# Patient Record
(demographics unavailable — no encounter records)

---

## 2024-12-04 NOTE — HISTORY OF PRESENT ILLNESS
[FreeTextEntry1] : Follow up hyperthyroidism due to Grave's disease,  thyroid nodules, osteoporosis.   Found to have severe hyperthyroidism 17 years ago in evaluation of weight loss and tremors.   Was started on MMI and has been taking it since diagnosis.   Did have some orbitopathy, which has improved.   Refuses  I-131 therapy.  Has not followed through with request for surgical consult for thyroidectomy. Thyroid US showed isthmus 2.1 cm isoechoic nodule.  Thyroid uptake and scan was recommended, but patient did not complete.   History of hip fracture and DXA showed T score of - 3.1.   Taking Calcium + D 500 mg daily.  Has 1-2 servings of dairy every day.    Started Alendronate in 10/2022 and taking weekly vitamin D.   Current Regimen: Propranolol ER 60 mg daily MMI  5 mg once daily (reduced last visit) Alendronate 70 mg qweek  Denies any palpitations, tremors or heat intolerance.

## 2024-12-04 NOTE — PHYSICAL EXAM
[Healthy Appearance] : healthy appearance [No Acute Distress] : no acute distress [Normal Sclera/Conjunctiva] : normal sclera/conjunctiva [No Proptosis] : no proptosis [No LAD] : no lymphadenopathy [Supple] : the neck was supple [No Respiratory Distress] : no respiratory distress [Clear to Auscultation] : lungs were clear to auscultation bilaterally [Normal S1, S2] : normal S1 and S2 [No Murmurs] : no murmurs [Normal Rate] : heart rate was normal [Regular Rhythm] : with a regular rhythm [Acanthosis Nigricans] : no acanthosis nigricans [No Tremors] : no tremors [Normal Affect] : the affect was normal [Normal Insight/Judgement] : insight and judgment were intact [Normal Mood] : the mood was normal [de-identified] : Thyroid is enlarged twice normal size and nodular

## 2024-12-04 NOTE — REASON FOR VISIT
[Follow - Up] : a follow-up visit [Hyperthyroidism] : hyperthyroidism [Osteoporosis] : osteoporosis [Thyroid nodule/ MNG] : thyroid nodule/ MNG

## 2024-12-04 NOTE — ASSESSMENT
[FreeTextEntry1] : 68 year old female with Hyperthyroidism due to Graves disease with associated multinodular goiter and osteoporosis here for follow up.   Her hyperthyroidism has worsened.    1. Hyperthyroidism-   Recommended definitive therapy with surgery since she refuses I-131.  Increase MMI back to 7.5 mg daily.    2. Thyroid nodules-   Check thyroid US now.    3. OP- Continue vitamin D Rx and alendronate.   Check DXA now.   Follow up in 4 months.

## 2024-12-04 NOTE — DATA REVIEWED
[FreeTextEntry1] : Thyroid US 3/15/2022: Right isthmus 1.1 x 0.9 x 1 cm echogenic nodule (smaller) left isthmus  2.1 x 1.5 x 1.6 cm isoechoic nodule (increased in size) left lower pole 1.1 x 1 x 1.1 cm isoechoic nodule (new)  Thyroid US  4/8/2018: Right lobe 7 x 3.4 x 2.5 cm Left lobe 6 x 2.2 x 2.8 Markedly heterogeneous and vascular gland Right isthmus 1.5 x 1.4 x 1.1 cm echogenic nodule (enlarged).  Left isthmus 0.9 x  0.9 x 0.9 cm nodule  LABS: 3/15/2022: TSI  20 TSH < 0.005 FT4  2.74 FT3  12.6

## 2025-04-15 NOTE — HISTORY OF PRESENT ILLNESS
[FreeTextEntry1] : Follow up hyperthyroidism due to Grave's disease,  thyroid nodules, osteoporosis.   Found to have severe hyperthyroidism 17 years ago in evaluation of weight loss and tremors.   Was started on MMI and has been taking it since diagnosis.   Did have some orbitopathy, which has improved.   Refuses  I-131 therapy or surgery.   Has not followed through with request for surgical consult for thyroidectomy. Thyroid US showed isthmus 2.1 cm isoechoic nodule.  Thyroid uptake and scan was recommended, but patient did not complete.   History of hip fracture and DXA showed T score of - 3.1.   Taking Calcium + D 500 mg daily.  Has 1-2 servings of dairy every day.    Started Alendronate in 10/2022 and taking weekly vitamin D.   Current Regimen: Propranolol ER 60 mg daily MMI  7.5 mg once daily  (increased last visit) Alendronate 70 mg qweek Vitamin D2 50,000 IU once a week.  Complains of tremors and weight loss.  No palpitations or heat intolerance.

## 2025-04-15 NOTE — PHYSICAL EXAM
[Healthy Appearance] : healthy appearance [No Acute Distress] : no acute distress [Normal Sclera/Conjunctiva] : normal sclera/conjunctiva [No Proptosis] : no proptosis [No LAD] : no lymphadenopathy [Supple] : the neck was supple [No Respiratory Distress] : no respiratory distress [Clear to Auscultation] : lungs were clear to auscultation bilaterally [Normal S1, S2] : normal S1 and S2 [No Murmurs] : no murmurs [Normal Rate] : heart rate was normal [Regular Rhythm] : with a regular rhythm [Acanthosis Nigricans] : no acanthosis nigricans [No Tremors] : no tremors [Normal Affect] : the affect was normal [Normal Insight/Judgement] : insight and judgment were intact [Normal Mood] : the mood was normal [de-identified] : Thyroid is enlarged twice normal size and nodular

## 2025-04-15 NOTE — DATA REVIEWED
[FreeTextEntry1] : Thyroid US 4/8/2025: Isthmus 2.1 x 1.6 x 1.9 cm hypo nodule (stable) New 1.6 cm cyst posterior to left lobe. New RUP 0.4 cm calcified nodule   Thyroid US 3/15/2022: Right isthmus 1.1 x 0.9 x 1 cm echogenic nodule (smaller) left isthmus  2.1 x 1.5 x 1.6 cm isoechoic nodule (increased in size) left lower pole 1.1 x 1 x 1.1 cm isoechoic nodule (new)  Thyroid US  4/8/2018: Right lobe 7 x 3.4 x 2.5 cm Left lobe 6 x 2.2 x 2.8 Markedly heterogeneous and vascular gland Right isthmus 1.5 x 1.4 x 1.1 cm echogenic nodule (enlarged).  Left isthmus 0.9 x  0.9 x 0.9 cm nodule  DXA 4/8/2025: L spine -2.1 R Femoral Neck -3 Total Hip -3.2

## 2025-04-15 NOTE — ASSESSMENT
[FreeTextEntry1] : 68 year old female with Hyperthyroidism due to Graves disease with associated multinodular goiter and osteoporosis here for follow up.   Her hyperthyroidism has worsened.    1. Hyperthyroidism-   Recommended definitive therapy with surgery since she refuses I-131.  We discussed increased mortality risk of patients opting for medical therapy.  She declines surgical intervention at this time. For now continue current dose of MMI.  2. Thyroid nodules-   Repeat US in one year.   3. OP-   Improved BMD at left hip and Spine, but stable at left hip.   Continue alendronate and vitamin D Rx.     Next DXA due in 2027.  Follow up in 4-6 months.